# Patient Record
Sex: FEMALE
[De-identification: names, ages, dates, MRNs, and addresses within clinical notes are randomized per-mention and may not be internally consistent; named-entity substitution may affect disease eponyms.]

---

## 2019-02-15 ENCOUNTER — HOSPITAL ENCOUNTER (EMERGENCY)
Dept: HOSPITAL 5 - ED | Age: 29
Discharge: HOME | End: 2019-02-15
Payer: SELF-PAY

## 2019-02-15 VITALS — DIASTOLIC BLOOD PRESSURE: 83 MMHG | SYSTOLIC BLOOD PRESSURE: 125 MMHG

## 2019-02-15 DIAGNOSIS — N93.9: Primary | ICD-10-CM

## 2019-02-15 DIAGNOSIS — N89.8: ICD-10-CM

## 2019-02-15 DIAGNOSIS — F17.200: ICD-10-CM

## 2019-02-15 LAB
BACTERIA #/AREA URNS HPF: (no result) /HPF
BASOPHILS # (AUTO): 0 K/MM3 (ref 0–0.1)
BASOPHILS NFR BLD AUTO: 0.4 % (ref 0–1.8)
BILIRUB UR QL STRIP: (no result)
BLOOD UR QL VISUAL: (no result)
EOSINOPHIL # BLD AUTO: 0.1 K/MM3 (ref 0–0.4)
EOSINOPHIL NFR BLD AUTO: 0.4 % (ref 0–4.3)
HCT VFR BLD CALC: 36.1 % (ref 30.3–42.9)
HGB BLD-MCNC: 12.3 GM/DL (ref 10.1–14.3)
LYMPHOCYTES # BLD AUTO: 1.8 K/MM3 (ref 1.2–5.4)
LYMPHOCYTES NFR BLD AUTO: 15.6 % (ref 13.4–35)
MCHC RBC AUTO-ENTMCNC: 34 % (ref 30–34)
MCV RBC AUTO: 89 FL (ref 79–97)
MONOCYTES # (AUTO): 0.7 K/MM3 (ref 0–0.8)
MONOCYTES % (AUTO): 5.9 % (ref 0–7.3)
MUCOUS THREADS #/AREA URNS HPF: (no result) /HPF
PH UR STRIP: 5 [PH] (ref 5–7)
PLATELET # BLD: 380 K/MM3 (ref 140–440)
RBC # BLD AUTO: 4.06 M/MM3 (ref 3.65–5.03)
RBC #/AREA URNS HPF: 12 /HPF (ref 0–6)
UROBILINOGEN UR-MCNC: < 2 MG/DL (ref ?–2)
WBC #/AREA URNS HPF: 26 /HPF (ref 0–6)

## 2019-02-15 PROCEDURE — 84702 CHORIONIC GONADOTROPIN TEST: CPT

## 2019-02-15 PROCEDURE — 85025 COMPLETE CBC W/AUTO DIFF WBC: CPT

## 2019-02-15 PROCEDURE — 36415 COLL VENOUS BLD VENIPUNCTURE: CPT

## 2019-02-15 PROCEDURE — 99284 EMERGENCY DEPT VISIT MOD MDM: CPT

## 2019-02-15 PROCEDURE — 87210 SMEAR WET MOUNT SALINE/INK: CPT

## 2019-02-15 PROCEDURE — 81001 URINALYSIS AUTO W/SCOPE: CPT

## 2019-02-15 PROCEDURE — 86850 RBC ANTIBODY SCREEN: CPT

## 2019-02-15 PROCEDURE — 96372 THER/PROPH/DIAG INJ SC/IM: CPT

## 2019-02-15 PROCEDURE — 81025 URINE PREGNANCY TEST: CPT

## 2019-02-15 PROCEDURE — 87591 N.GONORRHOEAE DNA AMP PROB: CPT

## 2019-02-15 PROCEDURE — 76830 TRANSVAGINAL US NON-OB: CPT

## 2019-02-15 PROCEDURE — 76856 US EXAM PELVIC COMPLETE: CPT

## 2019-02-15 PROCEDURE — 86901 BLOOD TYPING SEROLOGIC RH(D): CPT

## 2019-02-15 PROCEDURE — 86900 BLOOD TYPING SEROLOGIC ABO: CPT

## 2019-02-15 NOTE — EMERGENCY DEPARTMENT REPORT
ED Female  HPI





- General


Chief complaint: Abdominal Pain


Stated complaint: ABD PAIN


Time Seen by Provider: 02/15/19 11:25


Source: patient


Mode of arrival: Ambulatory


Limitations: No Limitations





- History of Present Illness


Initial comments: 





28-year-old female presents to the Hospital complaining of persistent suprapubic

cramping abdominal pain of exacerbating since  early January.  On July 3

patient was given a pill to induce an .  Ultrasound at that time showed 

that she was 7 weeks and 6 days pregnant.  Patient continues to have bleeding 

with approximately 5-8 pads per day with intermittent clots.  She complains of 

malodorous vaginal discharge a large amount of gushing fluid during recent 

sexual intercourse.  She missed her  follow-up appointment.





- Related Data


                                  Previous Rx's











 Medication  Instructions  Recorded  Last Taken  Type


 


Ibuprofen [Motrin] 800 mg PO Q8HR PRN #30 tablet 02/15/19 Unknown Rx











                                    Allergies











Allergy/AdvReac Type Severity Reaction Status Date / Time


 


No Known Allergies Allergy   Verified 02/15/19 13:51














ED Review of Systems


ROS: 


Stated complaint: ABD PAIN


Other details as noted in HPI





Comment: All other systems reviewed and negative





ED Past Medical Hx





- Past Medical History


Previous Medical History?: No





- Surgical History


Additional Surgical History: 





- Social History


Smoking Status: Current Every Day Smoker


Substance Use Type: None





- Medications


Home Medications: 


                                Home Medications











 Medication  Instructions  Recorded  Confirmed  Last Taken  Type


 


Ibuprofen [Motrin] 800 mg PO Q8HR PRN #30 tablet 02/15/19  Unknown Rx














ED Physical Exam





- General


Limitations: No Limitations





- Other


Other exam information: 





General: No limitations, patient is alert in no acute distress


Head exam: Atraumatic, normocephalic


Eyes exam: Normal appearance


ENT: Moist mucous membrane


Neck exam: Normal inspection, full range of motion, no meningismus nontender


Respiratory exam: Clear to auscultation bilateral, no wheezes, rales, crackles


Cardiovascular: Normal rate and rhythm, normal heart sounds


Abdomen: Soft, nondistended, and  nontender, with normal bowel sounds, no 

rebound, or guarding


: No external lesions, dark moderate blood in the vault.  No CMT, uterine, or 

adnexal tenderness.


Extremity: Full range of motion normal inspection no deformity


Back: Normal Inspection, full range of motion, no tenderness


Neurologic: Alert, oriented x3, cranial nerves intact, no motor or sensory 

deficit


Psychiatric: normal affect, normal mood


Skin: Warm, dry, intact





ED Course


                                   Vital Signs











  02/15/19 02/15/19





  11:05 14:57


 


Temperature 98.1 F 98.2 F


 


Pulse Rate 114 H 92 H


 


Respiratory 20 18





Rate  


 


Blood Pressure 125/87 


 


Blood Pressure  125/83





[Left]  


 


O2 Sat by Pulse 100 100





Oximetry  














- Consultations


Consultation #1: 





02/15/19 15:08


case d/w Dr Harleen Phoenix. Rec outpt f/u in 1 week. no sex, no meds at this 

time. 





ED Medical Decision Making





- Lab Data


Result diagrams: 


                                 02/15/19 11:18











                                   Lab Results











  02/15/19 02/15/19 02/15/19 Range/Units





  11:18 11:18 11:18 


 


WBC  11.4 H    (4.5-11.0)  K/mm3


 


RBC  4.06    (3.65-5.03)  M/mm3


 


Hgb  12.3    (10.1-14.3)  gm/dl


 


Hct  36.1    (30.3-42.9)  %


 


MCV  89    (79-97)  fl


 


MCH  30    (28-32)  pg


 


MCHC  34    (30-34)  %


 


RDW  13.0 L    (13.2-15.2)  %


 


Plt Count  380    (140-440)  K/mm3


 


Lymph % (Auto)  15.6    (13.4-35.0)  %


 


Mono % (Auto)  5.9    (0.0-7.3)  %


 


Eos % (Auto)  0.4    (0.0-4.3)  %


 


Baso % (Auto)  0.4    (0.0-1.8)  %


 


Lymph #  1.8    (1.2-5.4)  K/mm3


 


Mono #  0.7    (0.0-0.8)  K/mm3


 


Eos #  0.1    (0.0-0.4)  K/mm3


 


Baso #  0.0    (0.0-0.1)  K/mm3


 


Seg Neutrophils %  77.7 H    (40.0-70.0)  %


 


Seg Neutrophils #  8.9 H    (1.8-7.7)  K/mm3


 


HCG, Quant   3.85   (0-4)  mIU/mL


 


Urine Color     (Yellow)  


 


Urine Turbidity     (Clear)  


 


Urine pH     (5.0-7.0)  


 


Ur Specific Gravity     (1.003-1.030)  


 


Urine Protein     (Negative)  mg/dL


 


Urine Glucose (UA)     (Negative)  mg/dL


 


Urine Ketones     (Negative)  mg/dL


 


Urine Blood     (Negative)  


 


Urine Nitrite     (Negative)  


 


Ur Reducing Substances     


 


Urine Bilirubin     (Negative)  


 


Urine Ictotest     


 


Urine Urobilinogen     (<2.0)  mg/dL


 


Ur Leukocyte Esterase     (Negative)  


 


Urine WBC (Auto)     (0.0-6.0)  /HPF


 


Urine RBC (Auto)     (0.0-6.0)  /HPF


 


U Epithel Cells (Auto)     (0-13.0)  /HPF


 


Urine Bacteria (Auto)     (Negative)  /HPF


 


Urine Mucus     /HPF


 


Urine HCG, Qual     (Negative)  


 


Blood Type    O POSITIVE  


 


Antibody Screen    Negative  














  02/15/19 Range/Units





  11:24 


 


WBC   (4.5-11.0)  K/mm3


 


RBC   (3.65-5.03)  M/mm3


 


Hgb   (10.1-14.3)  gm/dl


 


Hct   (30.3-42.9)  %


 


MCV   (79-97)  fl


 


MCH   (28-32)  pg


 


MCHC   (30-34)  %


 


RDW   (13.2-15.2)  %


 


Plt Count   (140-440)  K/mm3


 


Lymph % (Auto)   (13.4-35.0)  %


 


Mono % (Auto)   (0.0-7.3)  %


 


Eos % (Auto)   (0.0-4.3)  %


 


Baso % (Auto)   (0.0-1.8)  %


 


Lymph #   (1.2-5.4)  K/mm3


 


Mono #   (0.0-0.8)  K/mm3


 


Eos #   (0.0-0.4)  K/mm3


 


Baso #   (0.0-0.1)  K/mm3


 


Seg Neutrophils %   (40.0-70.0)  %


 


Seg Neutrophils #   (1.8-7.7)  K/mm3


 


HCG, Quant   (0-4)  mIU/mL


 


Urine Color  Yellow  (Yellow)  


 


Urine Turbidity  Slightly-cloudy  (Clear)  


 


Urine pH  5.0  (5.0-7.0)  


 


Ur Specific Gravity  1.021  (1.003-1.030)  


 


Urine Protein  30 mg/dl  (Negative)  mg/dL


 


Urine Glucose (UA)  Neg  (Negative)  mg/dL


 


Urine Ketones  Neg  (Negative)  mg/dL


 


Urine Blood  Lg  (Negative)  


 


Urine Nitrite  Neg  (Negative)  


 


Ur Reducing Substances  Not Reportable  


 


Urine Bilirubin  Neg  (Negative)  


 


Urine Ictotest  Not Reportable  


 


Urine Urobilinogen  < 2.0  (<2.0)  mg/dL


 


Ur Leukocyte Esterase  Lg  (Negative)  


 


Urine WBC (Auto)  26.0 H  (0.0-6.0)  /HPF


 


Urine RBC (Auto)  12.0  (0.0-6.0)  /HPF


 


U Epithel Cells (Auto)  8.0  (0-13.0)  /HPF


 


Urine Bacteria (Auto)  1+  (Negative)  /HPF


 


Urine Mucus  1+  /HPF


 


Urine HCG, Qual  Negative  (Negative)  


 


Blood Type   


 


Antibody Screen   














- Radiology Data


Radiology results: report reviewed








ULTRASOUND PELVIC COMPLETE 


ULTRASOUND TRANSVAGINAL 





HISTORY: Pelvic pain and bleeding, status post . 





COMPARISON: None. 





TECHNIQUE: Transabdominal and transvaginal ultrasound with color 


doppler interrogation. 





FINDINGS: 





Uterus: The uterus is anteverted and measures 8.5 x 5.1 x 5.4 cm. No 


uterine mass. Normal cervix. 





Endometrium: The endometrium is heterogeneous and thickened up to 2 cm. 


There is increased flow on color Doppler. 





Right ovary: Normal. 





Left ovary: Normal. 





No pelvic fluid or mass is identified. Normal color doppler 


interrogation. 











IMPRESSION: 


No viable intrauterine pregnancy is visualized. The endometrium is 


thickened up to 2 cm concerning for retained products of conception. 








- Medical Decision Making





Wet prep negative.  Gonorrhea and chlamydia pending.  Ultrasound reviewed with 

GYN.  Recommend one week follow-up for repeat Quant and evaluation as well as 

pelvic rest.  UA is a contaminate sample from vaginal discharge patient does not

 report dysuria





- Differential Diagnosis


ectopic, retained products, anemia, pelvic infection.


Critical Care Time: No


Critical care attestation.: 


If time is entered above; I have spent that time in minutes in the direct care 

of this critically ill patient, excluding procedure time.








ED Disposition


Clinical Impression: 


 Status post elective , Vaginal bleeding





Disposition:  TO HOME OR SELFCARE


Is pt being admited?: No


Does the pt Need Aspirin: No


Condition: Stable


Instructions:  Menorrhagia (ED)


Additional Instructions: 


It is very important that you follow-up with GYN in one week for repeat blood 

and examination.  No sex is recommended at this time until cleared by GYN.  

Return is symptoms worsen as indicated by the discharge instructions. Your 

gonorrhea and chlamydia tests are pending and take approximately 3-4 days 

result.  You may obtain results in medical records with a photo ID. You may  

also obtain results through the follow-up doctor office via medical record 

request.


Prescriptions: 


Ibuprofen [Motrin] 800 mg PO Q8HR PRN #30 tablet


 PRN Reason: Pain, Moderate (4-6)


Referrals: 


SURJIT PHOENIX MD [Staff Physician] - 7-10 days


(follow up in 1 week


)


Time of Disposition: 15:11

## 2019-02-15 NOTE — ULTRASOUND REPORT
ULTRASOUND PELVIC COMPLETE

ULTRASOUND TRANSVAGINAL



HISTORY: Pelvic pain and bleeding, status post .



COMPARISON:  None.



TECHNIQUE: Transabdominal and transvaginal ultrasound with color 

doppler interrogation.



FINDINGS:



Uterus: The uterus is anteverted and measures 8.5 x 5.1 x 5.4 cm. No 

uterine mass. Normal cervix.



Endometrium: The endometrium is heterogeneous and thickened up to 2 cm. 

There is increased flow on color Doppler.



Right ovary: Normal.



Left ovary: Normal.



No pelvic fluid or mass is identified. Normal color doppler 

interrogation.







IMPRESSION:

No viable intrauterine pregnancy is visualized. The endometrium is 

thickened up to 2 cm concerning for retained products of conception.